# Patient Record
Sex: MALE | Race: WHITE | NOT HISPANIC OR LATINO | URBAN - METROPOLITAN AREA
[De-identification: names, ages, dates, MRNs, and addresses within clinical notes are randomized per-mention and may not be internally consistent; named-entity substitution may affect disease eponyms.]

---

## 2024-11-08 ENCOUNTER — OFFICE VISIT (OUTPATIENT)
Age: 49
End: 2024-11-08
Payer: COMMERCIAL

## 2024-11-08 DIAGNOSIS — N50.3 EPIDIDYMAL CYST: ICD-10-CM

## 2024-11-08 DIAGNOSIS — N43.3 HYDROCELE, UNSPECIFIED HYDROCELE TYPE: Primary | ICD-10-CM

## 2024-11-08 NOTE — PROGRESS NOTES
Office Visit New Urology      Patient Name: Renato Montague  : 1975   MRN: [id number]     Chief Complaint:    Chief Complaint   Patient presents with    Epididymis mass       Referring Provider: Mikael Rogel MD    History of Present Illness: Renato Montague is a 49 y.o. male who presents to Urology today for evaluation secondary to epididymal cyst, scrotal swelling.  He has undergone recent scrotal ultrasound which revealed no intratesticular mass or lesion, right epididymal cyst.  He has had intermittent right scrotal swelling.  He denies current pain or discomfort.  Denies baseline bothersome lower urinary tract symptoms.  Denies history of gross hematuria or urinary tract infection.      Subjective      Review of System: Review of Systems   Genitourinary:  Negative for decreased urine volume, difficulty urinating, dysuria, enuresis, flank pain, frequency, hematuria and urgency.      I have reviewed the ROS documented by my clinical staff, updated appropriately and I agree. Steve Fregoso MD    Past Medical History: History reviewed. No pertinent past medical history.    Past Surgical History: History reviewed. No pertinent surgical history.    Family History: History reviewed. No pertinent family history.    Social History:   Social History     Socioeconomic History    Marital status:    Tobacco Use    Smoking status: Every Day     Types: Cigarettes     Passive exposure: Past    Smokeless tobacco: Never   Vaping Use    Vaping status: Never Used   Substance and Sexual Activity    Alcohol use: Defer    Drug use: Defer    Sexual activity: Defer       Medications:     Current Outpatient Medications:     cephalexin (KEFLEX) 500 MG capsule, Take 1 capsule by mouth 4 (Four) Times a Day. (Patient not taking: Reported on 2024), Disp: 40 capsule, Rfl: 0    tiZANidine (ZANAFLEX) 4 MG tablet, , Disp: , Rfl:     Allergies:   No Known Allergies    IPSS Questionnaire (AUA-7):  Over the past  month…    1)  Incomplete Emptying  How often have you had a sensation of not emptying your bladder?  0 - Not at all   2)  Frequency  How often have you had to urinate less than every two hours? 0 - Not at all   3)  Intermittency  How often have you found you stopped and started again several times when you urinated?  0 - Not at all   4) Urgency  How often have you found it difficult to postpone urination?  0 - Not at all   5) Weak Stream  How often have you had a weak urinary stream?  0 - Not at all   6) Straining  How often have you had to push or strain to begin urination?  0 - Not at all   7) Nocturia  How many times did you typically get up at night to urinate?  1 - 1 time   Total Score:  1       Quality of life due to urinary symptoms:  If you were to spend the rest of your life with your urinary condition the way it is now, how would you feel about that? 0-Delighted   Urine Leakage (Incontinence) 0-No Leakage       Objective     Physical Exam:   Vital Signs: There were no vitals filed for this visit.  There is no height or weight on file to calculate BMI.     Physical Exam  Vitals and nursing note reviewed.   Constitutional:       Appearance: Normal appearance.   HENT:      Head: Normocephalic and atraumatic.   Cardiovascular:      Comments: Well perfused  Pulmonary:      Effort: Pulmonary effort is normal.   Abdominal:      General: Abdomen is flat.      Palpations: Abdomen is soft.   Musculoskeletal:         General: Normal range of motion.   Skin:     General: Skin is warm and dry.   Neurological:      General: No focal deficit present.      Mental Status: He is alert and oriented to person, place, and time. Mental status is at baseline.   Psychiatric:         Mood and Affect: Mood normal.         Behavior: Behavior normal.         Thought Content: Thought content normal.         Judgment: Judgment normal.         Genitourinary  Penis: circumcised penis, glans normal, no penile discharge.  No  "rashes/lesions.    Testes: descended bilaterally, no masses, nontender to palpation. Remainder of scrotal contents normal. No hernia appreciated.      Labs:   Brief Urine Lab Results       None                 No results found for: \"GLUCOSE\", \"CALCIUM\", \"NA\", \"K\", \"CO2\", \"CL\", \"BUN\", \"CREATININE\", \"EGFRIFAFRI\", \"EGFRIFNONA\", \"BCR\", \"ANIONGAP\"    No results found for: \"WBC\", \"HGB\", \"HCT\", \"MCV\", \"PLT\"    Images:   No Images in the past 120 days found..    Measures:   Tobacco:   Renato Montague  reports that he has been smoking cigarettes. He has been exposed to tobacco smoke. He has never used smokeless tobacco. I have educated him on the risk of diseases from using tobacco products.   Assessment / Plan      Assessment/Plan:   49 y.o. male is here today for evaluation secondary to epididymal cyst and hydrocele.  We have reviewed scrotal ultrasound.  Physical examination today unremarkable.  At this time we would recommend against surgical intervention and have discussed conservative measures.  He is understanding agreeable.  We will continue to monitor if worsens symptoms, hydrocele we a further consider intervention.  We will have him follow-up in 6 months with repeat ultrasound.    Diagnoses and all orders for this visit:    1. Hydrocele, unspecified hydrocele type (Primary)  -     US Scrotum & Testicles; Future    2. Epididymal cyst           Follow Up:   Return in about 6 months (around 5/8/2025) for Recheck, Follow up after Imaging.    I spent approximately 45 minutes providing clinical care for this patient; including review of patient's chart and provider documentation, face to face time spent with patient in examination room (obtaining history, performing physical exam, discussing diagnosis and management options), placing orders, and completing patient documentation.     Steve Fregoso MD  [facility] [place]   "

## 2024-11-13 PROBLEM — N50.3 EPIDIDYMAL CYST: Status: ACTIVE | Noted: 2024-11-13

## 2024-11-13 PROBLEM — N43.3 HYDROCELE: Status: ACTIVE | Noted: 2024-11-13

## 2025-06-07 ENCOUNTER — HOSPITAL ENCOUNTER (OUTPATIENT)
Facility: HOSPITAL | Age: 50
Discharge: HOME | End: 2025-06-07
Payer: COMMERCIAL

## 2025-06-07 DIAGNOSIS — N43.3 HYDROCELE, UNSPECIFIED HYDROCELE TYPE: ICD-10-CM

## 2025-06-07 PROCEDURE — 76870 US EXAM SCROTUM: CPT

## 2025-06-20 ENCOUNTER — OFFICE VISIT (OUTPATIENT)
Dept: UROLOGY | Facility: CLINIC | Age: 50
End: 2025-06-20
Payer: COMMERCIAL

## 2025-06-20 DIAGNOSIS — R39.9 LOWER URINARY TRACT SYMPTOMS (LUTS): ICD-10-CM

## 2025-06-20 DIAGNOSIS — N50.3 EPIDIDYMAL CYST: ICD-10-CM

## 2025-06-20 DIAGNOSIS — N43.3 HYDROCELE, UNSPECIFIED HYDROCELE TYPE: Primary | ICD-10-CM

## 2025-06-20 PROCEDURE — 99214 OFFICE O/P EST MOD 30 MIN: CPT | Performed by: UROLOGY

## 2025-06-20 NOTE — PROGRESS NOTES
Follow Up Office Visit      Patient Name: Renato Montague  : 1975   MRN: [id number]     Chief Complaint:    Chief Complaint   Patient presents with    Hydrocele, unspecified hydrocele type       History of Present Illness: Renato Montague is a 50 y.o. male who presents today for annual follow-up secondary history of hydrocele, scrotal swelling.  He presents today with updated ultrasound.  He denies significant change in symptoms.  Denies current pain associated.    Subjective      Review of System: Review of Systems   Genitourinary:  Negative for decreased urine volume, difficulty urinating, dysuria, enuresis, flank pain, frequency, hematuria and urgency.      I have reviewed the ROS documented by my clinical staff, updated as appropriate and I agree. Steve Freogso MD    I have reviewed and the following portions of the patient's history were updated as appropriate: past family history, past medical history, past social history, past surgical history and problem list.    Medications:     Current Outpatient Medications:     cephalexin (KEFLEX) 500 MG capsule, Take 1 capsule by mouth 4 (Four) Times a Day. (Patient not taking: Reported on 2025), Disp: 40 capsule, Rfl: 0    tiZANidine (ZANAFLEX) 4 MG tablet, , Disp: , Rfl:     Allergies:   No Known Allergies    IPSS Questionnaire (AUA-7):  Over the past month…    1)  Incomplete Emptying  How often have you had a sensation of not emptying your bladder?  0 - Not at all   2)  Frequency  How often have you had to urinate less than every two hours? 0 - Not at all   3)  Intermittency  How often have you found you stopped and started again several times when you urinated?  0 - Not at all   4) Urgency  How often have you found it difficult to postpone urination?  0 - Not at all   5) Weak Stream  How often have you had a weak urinary stream?  0 - Not at all   6) Straining  How often have you had to push or strain to begin urination?  0 - Not at all   7)  "Nocturia  How many times did you typically get up at night to urinate?  1 - 1 time   Total Score:  1       Quality of life due to urinary symptoms:  If you were to spend the rest of your life with your urinary condition the way it is now, how would you feel about that? 0-Delighted   Urine Leakage (Incontinence) 0-No Leakage       Objective     Physical Exam:   Vital Signs: There were no vitals filed for this visit.  There is no height or weight on file to calculate BMI.     Physical Exam  Vitals and nursing note reviewed.   Constitutional:       Appearance: Normal appearance.   HENT:      Head: Normocephalic and atraumatic.   Cardiovascular:      Comments: Well perfused  Pulmonary:      Effort: Pulmonary effort is normal.   Abdominal:      General: Abdomen is flat.      Palpations: Abdomen is soft.   Musculoskeletal:         General: Normal range of motion.   Skin:     General: Skin is warm and dry.   Neurological:      General: No focal deficit present.      Mental Status: He is alert and oriented to person, place, and time. Mental status is at baseline.   Psychiatric:         Mood and Affect: Mood normal.         Behavior: Behavior normal.         Thought Content: Thought content normal.         Judgment: Judgment normal.         Genitourinary  Penis: circumcised penis, glans normal, no penile discharge.  No rashes/lesions.    Testes: descended bilaterally, no masses, nontender to palpation. Remainder of scrotal contents normal. No hernia appreciated.      Labs:   Brief Urine Lab Results       None                 No results found for: \"GLUCOSE\", \"CALCIUM\", \"NA\", \"K\", \"CO2\", \"CL\", \"BUN\", \"CREATININE\", \"EGFRIFAFRI\", \"EGFRIFNONA\", \"BCR\", \"ANIONGAP\"    No results found for: \"WBC\", \"HGB\", \"HCT\", \"MCV\", \"PLT\"    Images:   US Scrotum & Testicles  Result Date: 6/11/2025  Impression: 1. Negative for testicular torsion or solid mass. 2. Small bilateral hydroceles with floating echogenic debris. 3. Borderline varicoceles " bilaterally. 4. Stable nonvascular lesion of the right epididymis probably representing cyst with internal debris versus spermatocele. Tiny left epididymal cyst measuring 1 mm is benign. Electronically Signed: Reese Miller MD  6/11/2025 12:10 PM EDT  Workstation ID: [id number]      Measures:   Tobacco:   Renato Montague  reports that he has been smoking cigarettes. He has a 37.5 pack-year smoking history. He has been exposed to tobacco smoke. He has never used smokeless tobacco. I have educated him on the risk of diseases from using tobacco product  Assessment / Plan      Assessment/Plan:   50 y.o. male is seen today for annual follow-up secondary to history of known hydrocele.  Ultrasound is stable with small bilateral hydrocele.  He is currently asymptomatic.  We have again recommended surveillance versus operative invention.  If there is change in size or symptoms status we may further consider and he is agreeable.  Return 1 year for urinary symptom check and symptom check regarding hydrocele. Alert with acute change in symptoms.     Diagnoses and all orders for this visit:    1. Hydrocele, unspecified hydrocele type (Primary)    2. Epididymal cyst    3. Lower urinary tract symptoms (LUTS)         Follow Up:   Return in about 1 year (around 6/20/2026) for Recheck.     I spent approximately 30 minutes providing clinical care for this patient; including review of patient's chart and provider documentation, face to face time spent with patient in examination room (obtaining history, performing physical exam, discussing diagnosis and management options), placing orders, and completing patient documentation.     Steve Fregoso MD  [facility] [place]

## 2025-06-24 PROBLEM — R39.9 LOWER URINARY TRACT SYMPTOMS (LUTS): Status: ACTIVE | Noted: 2025-06-24
